# Patient Record
Sex: FEMALE | Race: BLACK OR AFRICAN AMERICAN
[De-identification: names, ages, dates, MRNs, and addresses within clinical notes are randomized per-mention and may not be internally consistent; named-entity substitution may affect disease eponyms.]

---

## 2018-05-22 ENCOUNTER — HOSPITAL ENCOUNTER (OUTPATIENT)
Dept: HOSPITAL 19 - COL.VAS | Age: 23
End: 2018-05-22
Attending: OBSTETRICS & GYNECOLOGY
Payer: COMMERCIAL

## 2018-05-22 DIAGNOSIS — Z3A.15: ICD-10-CM

## 2018-05-22 DIAGNOSIS — O99.89: Primary | ICD-10-CM

## 2018-05-22 DIAGNOSIS — R55: ICD-10-CM

## 2018-05-22 DIAGNOSIS — O30.002: ICD-10-CM

## 2018-05-29 ENCOUNTER — HOSPITAL ENCOUNTER (OUTPATIENT)
Dept: HOSPITAL 19 - COL.CARD | Age: 23
End: 2018-05-29
Attending: OBSTETRICS & GYNECOLOGY
Payer: COMMERCIAL

## 2018-05-29 DIAGNOSIS — R55: Primary | ICD-10-CM

## 2018-10-17 ENCOUNTER — HOSPITAL ENCOUNTER (OUTPATIENT)
Dept: HOSPITAL 19 - LDRO | Age: 23
Discharge: HOME | End: 2018-10-17
Attending: OBSTETRICS & GYNECOLOGY
Payer: COMMERCIAL

## 2018-10-17 VITALS — TEMPERATURE: 98.5 F | HEART RATE: 93 BPM | DIASTOLIC BLOOD PRESSURE: 73 MMHG | SYSTOLIC BLOOD PRESSURE: 116 MMHG

## 2018-10-17 VITALS — DIASTOLIC BLOOD PRESSURE: 73 MMHG | TEMPERATURE: 98.5 F | SYSTOLIC BLOOD PRESSURE: 116 MMHG | HEART RATE: 93 BPM

## 2018-10-17 VITALS — BODY MASS INDEX: 36.41 KG/M2 | WEIGHT: 200.4 LBS | HEIGHT: 62.01 IN

## 2018-10-17 DIAGNOSIS — Z3A.36: ICD-10-CM

## 2020-06-12 ENCOUNTER — HOSPITAL ENCOUNTER (EMERGENCY)
Dept: HOSPITAL 19 - COL.ER | Age: 25
Discharge: HOME | End: 2020-06-12
Payer: COMMERCIAL

## 2020-06-12 VITALS — SYSTOLIC BLOOD PRESSURE: 129 MMHG | DIASTOLIC BLOOD PRESSURE: 85 MMHG

## 2020-06-12 VITALS — HEIGHT: 62.01 IN | WEIGHT: 168.43 LBS | BODY MASS INDEX: 30.6 KG/M2

## 2020-06-12 VITALS — TEMPERATURE: 97.7 F

## 2020-06-12 VITALS — HEART RATE: 70 BPM

## 2020-06-12 DIAGNOSIS — H10.13: ICD-10-CM

## 2020-06-12 DIAGNOSIS — L23.9: Primary | ICD-10-CM

## 2020-12-08 ENCOUNTER — HOSPITAL ENCOUNTER (OUTPATIENT)
Dept: HOSPITAL 19 - COL.RAD | Age: 25
End: 2020-12-08
Attending: FAMILY MEDICINE
Payer: COMMERCIAL

## 2020-12-08 DIAGNOSIS — R51.9: Primary | ICD-10-CM

## 2021-09-27 ENCOUNTER — HOSPITAL ENCOUNTER (INPATIENT)
Dept: HOSPITAL 19 - OB | Age: 26
LOS: 2 days | Discharge: HOME | End: 2021-09-29
Attending: OBSTETRICS & GYNECOLOGY | Admitting: OBSTETRICS & GYNECOLOGY
Payer: COMMERCIAL

## 2021-09-27 VITALS — HEART RATE: 81 BPM | SYSTOLIC BLOOD PRESSURE: 111 MMHG | TEMPERATURE: 97.7 F | DIASTOLIC BLOOD PRESSURE: 69 MMHG

## 2021-09-27 VITALS — HEART RATE: 93 BPM | DIASTOLIC BLOOD PRESSURE: 62 MMHG | TEMPERATURE: 98.5 F | SYSTOLIC BLOOD PRESSURE: 107 MMHG

## 2021-09-27 VITALS — DIASTOLIC BLOOD PRESSURE: 72 MMHG | SYSTOLIC BLOOD PRESSURE: 118 MMHG | HEART RATE: 75 BPM

## 2021-09-27 VITALS — SYSTOLIC BLOOD PRESSURE: 103 MMHG | DIASTOLIC BLOOD PRESSURE: 78 MMHG | HEART RATE: 62 BPM

## 2021-09-27 VITALS — HEART RATE: 71 BPM | SYSTOLIC BLOOD PRESSURE: 104 MMHG | DIASTOLIC BLOOD PRESSURE: 76 MMHG

## 2021-09-27 VITALS — SYSTOLIC BLOOD PRESSURE: 128 MMHG | DIASTOLIC BLOOD PRESSURE: 84 MMHG | HEART RATE: 74 BPM

## 2021-09-27 VITALS — SYSTOLIC BLOOD PRESSURE: 102 MMHG | HEART RATE: 68 BPM | DIASTOLIC BLOOD PRESSURE: 63 MMHG

## 2021-09-27 VITALS — HEART RATE: 72 BPM | SYSTOLIC BLOOD PRESSURE: 107 MMHG | DIASTOLIC BLOOD PRESSURE: 76 MMHG

## 2021-09-27 VITALS — SYSTOLIC BLOOD PRESSURE: 106 MMHG | DIASTOLIC BLOOD PRESSURE: 74 MMHG | HEART RATE: 71 BPM | TEMPERATURE: 98 F

## 2021-09-27 VITALS — DIASTOLIC BLOOD PRESSURE: 79 MMHG | SYSTOLIC BLOOD PRESSURE: 109 MMHG

## 2021-09-27 VITALS — HEART RATE: 61 BPM | DIASTOLIC BLOOD PRESSURE: 73 MMHG | SYSTOLIC BLOOD PRESSURE: 109 MMHG

## 2021-09-27 VITALS — SYSTOLIC BLOOD PRESSURE: 113 MMHG | DIASTOLIC BLOOD PRESSURE: 71 MMHG | HEART RATE: 77 BPM

## 2021-09-27 VITALS — BODY MASS INDEX: 36.41 KG/M2 | HEIGHT: 62.06 IN | WEIGHT: 200.4 LBS

## 2021-09-27 VITALS — HEART RATE: 64 BPM | DIASTOLIC BLOOD PRESSURE: 76 MMHG | SYSTOLIC BLOOD PRESSURE: 108 MMHG

## 2021-09-27 VITALS — SYSTOLIC BLOOD PRESSURE: 110 MMHG | HEART RATE: 64 BPM | DIASTOLIC BLOOD PRESSURE: 70 MMHG

## 2021-09-27 VITALS — HEART RATE: 60 BPM | DIASTOLIC BLOOD PRESSURE: 77 MMHG | SYSTOLIC BLOOD PRESSURE: 109 MMHG

## 2021-09-27 VITALS — SYSTOLIC BLOOD PRESSURE: 108 MMHG | HEART RATE: 69 BPM | DIASTOLIC BLOOD PRESSURE: 76 MMHG

## 2021-09-27 VITALS — DIASTOLIC BLOOD PRESSURE: 57 MMHG | SYSTOLIC BLOOD PRESSURE: 116 MMHG | HEART RATE: 68 BPM

## 2021-09-27 DIAGNOSIS — D57.3: ICD-10-CM

## 2021-09-27 DIAGNOSIS — O34.211: Primary | ICD-10-CM

## 2021-09-27 DIAGNOSIS — D64.9: ICD-10-CM

## 2021-09-27 DIAGNOSIS — Z3A.39: ICD-10-CM

## 2021-09-27 DIAGNOSIS — D69.6: ICD-10-CM

## 2021-09-27 LAB
BASOPHILS # BLD: 0 10*3/UL (ref 0–0.2)
BASOPHILS NFR BLD AUTO: 0.3 % (ref 0–2)
EOSINOPHIL # BLD: 0.1 10*3/UL (ref 0–0.7)
EOSINOPHIL NFR BLD: 1 % (ref 0–4)
ERYTHROCYTE [DISTWIDTH] IN BLOOD BY AUTOMATED COUNT: 14.5 % (ref 11.5–14.5)
GRANULOCYTES # BLD AUTO: 63.9 % (ref 42.2–75.2)
HCT VFR BLD AUTO: 32.8 % (ref 37–47)
HGB BLD-MCNC: 11.3 G/DL (ref 12.5–16)
LYMPHOCYTES # BLD: 1.9 10*3/UL (ref 1.2–3.4)
LYMPHOCYTES NFR BLD: 26.7 % (ref 20–51)
MCH RBC QN AUTO: 29 PG (ref 27–31)
MCHC RBC AUTO-ENTMCNC: 35 G/DL (ref 33–37)
MCV RBC AUTO: 83 FL (ref 80–100)
MONOCYTES # BLD: 0.5 10*3/UL (ref 0.1–0.6)
MONOCYTES NFR BLD AUTO: 6.8 % (ref 1.7–9.3)
NEUTROPHILS # BLD: 4.6 10*3/UL (ref 1.4–6.5)
PLATELET # BLD AUTO: 120 K/MM3 (ref 130–400)
PMV BLD AUTO: 13.7 FL (ref 7.4–10.4)
RBC # BLD AUTO: 3.97 M/MM3 (ref 4.1–5.3)

## 2021-09-27 NOTE — NUR
0845- 39.0, G2L2 here on unit for scheduled repeat  section.
Ambulatory to 210 with spouse. Clean gown on. Patient reports normal fetal
movement, denies russell LOF, VB, or contractions.
 
0855- EFM explained and placed x2. VS obtained. Assessment completed.
 
0900- IV to left FA. Labs obtained via IV site. LR infusing. Consent forms
explained and signed.

## 2021-09-28 VITALS — TEMPERATURE: 98.1 F | HEART RATE: 98 BPM | DIASTOLIC BLOOD PRESSURE: 70 MMHG | SYSTOLIC BLOOD PRESSURE: 119 MMHG

## 2021-09-28 VITALS — SYSTOLIC BLOOD PRESSURE: 103 MMHG | TEMPERATURE: 98 F | DIASTOLIC BLOOD PRESSURE: 63 MMHG | HEART RATE: 80 BPM

## 2021-09-28 VITALS — SYSTOLIC BLOOD PRESSURE: 115 MMHG | TEMPERATURE: 97.4 F | HEART RATE: 69 BPM | DIASTOLIC BLOOD PRESSURE: 69 MMHG

## 2021-09-28 VITALS — SYSTOLIC BLOOD PRESSURE: 102 MMHG | HEART RATE: 88 BPM | DIASTOLIC BLOOD PRESSURE: 32 MMHG | TEMPERATURE: 97.6 F

## 2021-09-28 LAB
BASOPHILS # BLD: 0 10*3/UL (ref 0–0.2)
BASOPHILS NFR BLD AUTO: 0.1 % (ref 0–2)
EOSINOPHIL # BLD: 0 10*3/UL (ref 0–0.7)
EOSINOPHIL NFR BLD: 0 % (ref 0–4)
ERYTHROCYTE [DISTWIDTH] IN BLOOD BY AUTOMATED COUNT: 14.4 % (ref 11.5–14.5)
GRANULOCYTES # BLD AUTO: 86.2 % (ref 42.2–75.2)
HCT VFR BLD AUTO: 28.3 % (ref 37–47)
HGB BLD-MCNC: 9.6 G/DL (ref 12.5–16)
LYMPHOCYTES # BLD: 1.1 10*3/UL (ref 1.2–3.4)
LYMPHOCYTES NFR BLD: 7.9 % (ref 20–51)
MCH RBC QN AUTO: 28 PG (ref 27–31)
MCHC RBC AUTO-ENTMCNC: 34 G/DL (ref 33–37)
MCV RBC AUTO: 84 FL (ref 80–100)
MONOCYTES # BLD: 0.7 10*3/UL (ref 0.1–0.6)
MONOCYTES NFR BLD AUTO: 5 % (ref 1.7–9.3)
NEUTROPHILS # BLD: 12.5 10*3/UL (ref 1.4–6.5)
PLATELET # BLD AUTO: 126 K/MM3 (ref 130–400)
PMV BLD AUTO: 14.4 FL (ref 7.4–10.4)
RBC # BLD AUTO: 3.38 M/MM3 (ref 4.1–5.3)

## 2021-09-28 NOTE — NUR
Initial visit; Patient thanked  for offering congratulations for the
birth of her son.  thanked mom for choosing our hospital.

## 2021-09-29 VITALS — SYSTOLIC BLOOD PRESSURE: 107 MMHG | TEMPERATURE: 97.9 F | DIASTOLIC BLOOD PRESSURE: 70 MMHG | HEART RATE: 80 BPM
